# Patient Record
Sex: MALE | Race: WHITE | NOT HISPANIC OR LATINO | Employment: FULL TIME | URBAN - METROPOLITAN AREA
[De-identification: names, ages, dates, MRNs, and addresses within clinical notes are randomized per-mention and may not be internally consistent; named-entity substitution may affect disease eponyms.]

---

## 2017-07-31 ENCOUNTER — OFFICE VISIT (OUTPATIENT)
Dept: OTOLARYNGOLOGY | Facility: CLINIC | Age: 27
End: 2017-07-31
Payer: COMMERCIAL

## 2017-07-31 VITALS
HEART RATE: 54 BPM | BODY MASS INDEX: 24.53 KG/M2 | DIASTOLIC BLOOD PRESSURE: 68 MMHG | WEIGHT: 188.5 LBS | TEMPERATURE: 98 F | SYSTOLIC BLOOD PRESSURE: 122 MMHG

## 2017-07-31 DIAGNOSIS — J30.89 CHRONIC NON-SEASONAL ALLERGIC RHINITIS, UNSPECIFIED TRIGGER: Primary | ICD-10-CM

## 2017-07-31 DIAGNOSIS — Z98.890 HISTORY OF SINUS SURGERY: ICD-10-CM

## 2017-07-31 PROCEDURE — 99204 OFFICE O/P NEW MOD 45 MIN: CPT | Mod: 25,S$GLB,, | Performed by: ORTHOPAEDIC SURGERY

## 2017-07-31 PROCEDURE — 31231 NASAL ENDOSCOPY DX: CPT | Mod: S$GLB,,, | Performed by: ORTHOPAEDIC SURGERY

## 2017-07-31 PROCEDURE — 99999 PR PBB SHADOW E&M-EST. PATIENT-LVL III: CPT | Mod: PBBFAC,,, | Performed by: ORTHOPAEDIC SURGERY

## 2017-07-31 NOTE — PROGRESS NOTES
Subjective:       Patient ID: Quentin Pedroza is a 27 y.o. male.    Chief Complaint: Sinus Problem and Other (Can't taste most foods)    Patient is a very pleasant 27 year old gentleman here to see me today for the first time for evaluation of loss of sense of taste and smell. He says that it has been an intermittent issue for the last three years.  He says that he has a history of surgery for nasal polyposis in 2002 in [place] (also had tonsillectomy at that time and septoplasty).  He has been using Flonase for the last week, and does feel that it is helping.  He is also using a Nedipot as needed.  He says that it helps slightly.  He does find that using an antihistamine and nasal spray does help with his sense of taste and nasal congestion.  He does correlate his loss of sense of smell with his loss of taste (taste issues are worse when his nose is congested).  He is a non-smoker.  He has no personal history of asthma.      Review of Systems   Constitutional: Negative for fatigue, fever and unexpected weight change.   HENT: Positive for congestion and trouble swallowing (choking on liquids at times). Negative for ear discharge, ear pain, facial swelling, hearing loss, nosebleeds, postnasal drip, rhinorrhea, sinus pressure, sneezing, sore throat, tinnitus and voice change.    Eyes: Negative for discharge, redness and itching.   Respiratory: Negative for cough, choking, shortness of breath and wheezing.    Cardiovascular: Negative for chest pain and palpitations.   Gastrointestinal: Negative for abdominal pain.        No reflux.   Musculoskeletal: Negative for neck pain.   Allergic/Immunologic: Positive for environmental allergies.   Neurological: Negative for dizziness, facial asymmetry, light-headedness and headaches.   Hematological: Negative for adenopathy. Does not bruise/bleed easily.   Psychiatric/Behavioral: Negative for agitation, behavioral problems, confusion and decreased concentration.       Objective:       Physical Exam   Constitutional: He is oriented to person, place, and time. Vital signs are normal. He appears well-developed and well-nourished. No distress.   HENT:   Head: Normocephalic and atraumatic.   Right Ear: Hearing, tympanic membrane, external ear and ear canal normal.   Left Ear: Hearing, tympanic membrane, external ear and ear canal normal.   Nose: Nose normal. No mucosal edema, rhinorrhea, nasal deformity or septal deviation.   Mouth/Throat: Uvula is midline, oropharynx is clear and moist and mucous membranes are normal. No trismus in the jaw. Normal dentition. No uvula swelling. No oropharyngeal exudate or posterior oropharyngeal edema.   Eyes: Conjunctivae and EOM are normal. Pupils are equal, round, and reactive to light. Right eye exhibits no chemosis. Left eye exhibits no chemosis. Right conjunctiva is not injected. Left conjunctiva is not injected. No scleral icterus.   Neck: Trachea normal and phonation normal. No tracheal tenderness present. No tracheal deviation present. No thyroid mass and no thyromegaly present.   Cardiovascular: Intact distal pulses.    Pulmonary/Chest: Effort normal. No accessory muscle usage or stridor. No respiratory distress.   Lymphadenopathy:        Head (right side): No submental, no submandibular, no preauricular and no posterior auricular adenopathy present.        Head (left side): No submental, no submandibular, no preauricular and no posterior auricular adenopathy present.     He has no cervical adenopathy.        Right cervical: No superficial cervical and no deep cervical adenopathy present.       Left cervical: No superficial cervical and no deep cervical adenopathy present.   Neurological: He is alert and oriented to person, place, and time. No cranial nerve deficit.   Skin: Skin is warm and dry. No rash noted. No erythema.   Psychiatric: He has a normal mood and affect. His behavior is normal. Thought content normal.       PROCEDURE NOTE:  Nasal  endoscopy   Preprocedure diagnosis:  History of sinus surgery  Postprocedure diangosis:  Same  Complications:  None  Blood Loss:  None    Procedure in detail:  After verbal consent was obtained, the patient's nasal cavity was anesthesized using topical Tetracaine and Neosynepherine.  A rigid 30 degree endoscope was placed in first the right, then the left nasal cavity.  The inferior and middle turbinates were examined, and found to be normal bilaterally.  The middle meatus and maxillary antrum was also examined, and found to be normal bilaterally.  He did have mild synechia from his middle turbinate to the lateral nasl wall bilaterally, not obstructing his maxillary sinus.  No purulent drainage or masses seen.  The patient tolerated the procedure well and there were no complications.        Assessment:       1. Chronic non-seasonal allergic rhinitis, unspecified trigger    2. History of sinus surgery        Plan:       1.  Allergic rhinitis:  He does not improvement in his symptoms when he takes oral antihistamines and topical nasal steroid spray. Discussed that those medications are most effective if used consistently.  He also has found some benefit from use of nasal irrigation, would recommend increasing his use to daily.  2.  History of sinus surgery:  Reassured the patient that there is no sign of nasal polyposis today, and overall his sinus exam is fairly clear.  He had questions regarding balloon sinuplasty, and he is not a candidate for that procedure at this time (has no issue with recurrent infection, etc.).